# Patient Record
Sex: FEMALE | URBAN - METROPOLITAN AREA
[De-identification: names, ages, dates, MRNs, and addresses within clinical notes are randomized per-mention and may not be internally consistent; named-entity substitution may affect disease eponyms.]

---

## 2019-10-31 ENCOUNTER — APPOINTMENT (RX ONLY)
Dept: URBAN - METROPOLITAN AREA CLINIC 36 | Facility: CLINIC | Age: 64
Setting detail: DERMATOLOGY
End: 2019-10-31

## 2019-10-31 DIAGNOSIS — Z41.9 ENCOUNTER FOR PROCEDURE FOR PURPOSES OTHER THAN REMEDYING HEALTH STATE, UNSPECIFIED: ICD-10-CM

## 2019-10-31 PROCEDURE — ? BOTOX

## 2019-10-31 NOTE — PROCEDURE: BOTOX
Show Additional Area 1: Yes
Dilution (U/0.1 Cc): 0.6
Masseter Units: 0
Forehead Units: 15
Show Lcl Units: No
Additional Area 1 Location: upper lip
Additional Area 3 Location: masseter
Additional Area 2 Location: chin
Consent: Written consent obtained. Risks include but not limited to lid/brow ptosis, bruising, swelling, diplopia, temporary effect, incomplete chemical denervation.
Periorbital Skin Units: 14
Lot #: [id number]
Detail Level: Detailed
Post-Care Instructions: Patient instructed to not lie down for 4 hours and limit physical activity for 24 hours. Patient instructed not to travel by airplane for 48 hours.
Depressor Anguli Oris Units: 6
Expiration Date (Month Year): 04/2022

## 2024-07-22 ENCOUNTER — OFFICE (OUTPATIENT)
Dept: URBAN - METROPOLITAN AREA CLINIC 51 | Facility: CLINIC | Age: 69
End: 2024-07-22

## 2024-07-22 VITALS
HEART RATE: 86 BPM | TEMPERATURE: 96.9 F | WEIGHT: 120 LBS | DIASTOLIC BLOOD PRESSURE: 67 MMHG | HEIGHT: 64 IN | SYSTOLIC BLOOD PRESSURE: 116 MMHG

## 2024-07-22 DIAGNOSIS — R15.9 INCONTINENCE OF BOWEL: ICD-10-CM

## 2024-07-22 DIAGNOSIS — R11.2 NAUSEA AND VOMITING: ICD-10-CM

## 2024-07-22 DIAGNOSIS — Z12.11 SCREENING FOR COLON CANCER: ICD-10-CM

## 2024-07-22 DIAGNOSIS — K59.00 CONSTIPATION: ICD-10-CM

## 2024-07-22 DIAGNOSIS — K31.84 GASTROPARESIS: ICD-10-CM

## 2024-07-22 PROCEDURE — 99204 OFFICE O/P NEW MOD 45 MIN: CPT | Performed by: SPECIALIST

## 2024-07-22 NOTE — SERVICEHPINOTES
Savanah Cardona is a 68-year-old female patient presenting today for an evaluation of gastroparesis and constipation. She states that she has chronic constipation. She has hard stools and then 2-3 times a week she gets loose stools and rectal incontinence. She states that she has had incontinence with soft stools without blood or mucus. She has tried citrucil and it made it worse. We discussed the lower dose metamucil with possible imodium. We talked about a slow addition of fiber to produce soft stools to reverse the constipation. She was to use Imodium to get a formed stool, which should improve the incontinence and prevent hard stools. She has a hx of episiotomy which can lead to rectal incontinence. She has hx of colon resection for diverticulitis. She states that she has nausea and vomiting and has the episodes 6 times a year. She states that she gets dumping syndrome with raw vegetables. She has bloating and had a positive test for gastroparesis. She has abdominal distension. The patient has no family history of colon cancer or other significant GI diseases. The patient does not endorse having any abdominal pain. The patient does not have any chest ache or sore throat. The patient denies rectal bleeding. There is no history of losing weight abnormally and the patient’s weight has been stable. We can achieve less sxs despite the multiple sxs and we gave her a list of immediate measures to improve the sxs.  77

## 2024-07-22 NOTE — SERVICENOTES
The patient and family/friends if present were apprised of the use of a virtual scribe through remote viewing and/or audio and all parties consented to conduct the visit in this manner. Documentation assistance was provided by Halley Rodriguez.

## 2024-10-17 ENCOUNTER — OFFICE (OUTPATIENT)
Dept: URBAN - METROPOLITAN AREA CLINIC 51 | Facility: CLINIC | Age: 69
End: 2024-10-17

## 2024-10-17 VITALS — HEIGHT: 64 IN

## 2024-10-17 DIAGNOSIS — K64.9 HEMORRHOIDS: ICD-10-CM

## 2024-10-17 DIAGNOSIS — K44.9 HIATAL HERNIA: ICD-10-CM

## 2024-10-17 DIAGNOSIS — K22.9 IRREGULAR Z LINE OF ESOPHAGUS: ICD-10-CM

## 2024-10-17 DIAGNOSIS — K29.70 GASTRITIS: ICD-10-CM

## 2024-10-17 DIAGNOSIS — K31.7 BENIGN GASTRIC POLYP: ICD-10-CM

## 2024-10-17 DIAGNOSIS — Z86.010 PERSONAL HISTORY COLON POLYPS: ICD-10-CM

## 2024-10-17 DIAGNOSIS — K22.70 BARRETTS ESOPHAGUS: ICD-10-CM

## 2024-10-17 DIAGNOSIS — K59.00 CONSTIPATION: ICD-10-CM

## 2024-10-17 DIAGNOSIS — R15.9 INCONTINENCE OF BOWEL: ICD-10-CM

## 2024-10-17 DIAGNOSIS — K57.90 DIVERTICULOSIS: ICD-10-CM

## 2024-10-17 DIAGNOSIS — K31.84 GASTROPARESIS: ICD-10-CM

## 2024-10-17 PROCEDURE — 99213 OFFICE O/P EST LOW 20 MIN: CPT

## 2024-10-17 PROCEDURE — G2211 COMPLEX E/M VISIT ADD ON: HCPCS

## 2024-10-17 NOTE — SERVICEHPINOTES
10/17/2024: Savanah Cardona is a 69-year-old female that underwent an upper endoscopy and colonoscopy on 08/26/2024. The Edoscopcy showed Z-line irregular at the gastroesophageal junction, medium haital hernia, gastriris, non-bleeding duodenal ulcer with no stigmata of bleeding. The biopsy showed moderate duodenitis, gastritis, reflux esophagitis, Smith's no infection and no cancer, The colonoscopy showed 2 (4 to 6 mm) polyps in the ascending colon, diverticulitis in the sigmoid colon and descending colon, non-bleeding non thrombosed internal hemorrhoid, the biopsy was benign. A repeat colonoscopy is recommended in 5 years. (08/2029). 
br
br The consistency of the stools goes from formed to lose, she experiences fecal incontinence, she denies injury to back and back problems. The patient denies abdominal pain, distension, bloating, or fullness. The patient does not have any dyspepsia, nausea, or vomiting. Denies rectal bleeding or melena. The patient does not have any unintentional weight loss. Denies shortness of breath and chest pain.
br
br 07/22/2024: Savanah Cardona is a 68-year-old female patient presenting today for an evaluation of gastroparesis and constipation. She states that she has chronic constipation. She has hard stools and then 2-3 times a week she gets loose stools and rectal incontinence. She states that she has had incontinence with soft stools without blood or mucus. She has tried citrucil and it made it worse. We discussed the lower dose metamucil with possible imodium. We talked about a slow addition of fiber to produce soft stools to reverse the constipation. She was to use Imodium to get a formed stool, which should improve the incontinence and prevent hard stools. She has a hx of episiotomy which can lead to rectal incontinence. She has hx of colon resection for diverticulitis. She states that she has nausea and vomiting and has the episodes 6 times a year. She states that she gets dumping syndrome with raw vegetables. She has bloating and had a positive test for gastroparesis. She has abdominal distension. The patient has no family history of colon cancer or other significant GI diseases. The patient does not endorse having any abdominal pain. The patient does not have any chest ache or sore throat. The patient denies rectal bleeding. There is no history of losing weight abnormally and the patient’s weight has been stable. We can achieve less sxs despite the multiple sxs and we gave her a list of immediate measures to improve the sxs.

## 2025-01-23 VITALS
WEIGHT: 116 LBS | DIASTOLIC BLOOD PRESSURE: 68 MMHG | SYSTOLIC BLOOD PRESSURE: 133 MMHG | HEART RATE: 83 BPM | HEIGHT: 64 IN

## 2025-01-27 ENCOUNTER — OFFICE (OUTPATIENT)
Dept: URBAN - METROPOLITAN AREA CLINIC 95 | Facility: CLINIC | Age: 70
End: 2025-01-27

## 2025-01-27 DIAGNOSIS — R32 URINARY INCONTINENCE: ICD-10-CM

## 2025-01-27 DIAGNOSIS — K59.00 CONSTIPATION: ICD-10-CM

## 2025-01-27 DIAGNOSIS — R15.9 FECAL INCONTINENCE: ICD-10-CM

## 2025-01-27 PROCEDURE — G2211 COMPLEX E/M VISIT ADD ON: HCPCS | Performed by: INTERNAL MEDICINE

## 2025-01-27 PROCEDURE — 99203 OFFICE O/P NEW LOW 30 MIN: CPT | Mod: 25 | Performed by: INTERNAL MEDICINE

## 2025-01-27 NOTE — SERVICEHPINOTES
01/27/2025:  SAVANAH HAJI   returns today for a follow-up from the last visit on  10/17/2024  . The patient is a 69-year-old female presenting for evaluation of fecal incontinence. The patient reports episodes of fecal incontinence involving both solid and liquid stools. Her baseline bowel habits are characterized by constipation, and she frequently experiences a sensation of incomplete evacuation. She manages her constipation with Miralax as needed. She typically spends about 2 minutes on the toilet during bowel movements and does not prolong the time if evacuation is unsuccessful.The patient also has a history of urinary incontinence, which was previously treated surgically with a sling. However, she notes a recurrence of urinary incontinence symptoms.Past Diagnostic Results:br- Colonoscopy: Unremarkable, performed by Dr. Benavidez.The patient was informed that the conversation was recorded for scribing purposes. The patient has given verbal consent.   br
br
br 10/17/2024: Savanah Haji is a 69-year-old female that underwent an upper endoscopy and colonoscopy on 08/26/2024. The Edoscopcy showed Z-line irregular at the gastroesophageal junction, medium haital hernia, gastriris, non-bleeding duodenal ulcer with no stigmata of bleeding. The biopsy showed moderate duodenitis, gastritis, reflux esophagitis, Smith's no infection and no cancer, The colonoscopy showed 2 (4 to 6 mm) polyps in the ascending colon, diverticulitis in the sigmoid colon and descending colon, non-bleeding non thrombosed internal hemorrhoid, the biopsy was benign. A repeat colonoscopy is recommended in 5 years. (08/2029).The consistency of the stools goes from formed to lose, she experiences fecal incontinence, she denies injury to back and back problems. The patient denies abdominal pain, distension, bloating, or fullness. The patient does not have any dyspepsia, nausea, or vomiting. Denies rectal bleeding or melena. The patient does not have any unintentional weight loss. Denies shortness of breath and chest pain.07/22/2024: Savanah Haji is a 68-year-old female patient presenting today for an evaluation of gastroparesis and constipation. She states that she has chronic constipation. She has hard stools and then 2-3 times a week she gets loose stools and rectal incontinence. She states that she has had incontinence with soft stools without blood or mucus. She has tried citrucil and it made it worse. We discussed the lower dose metamucil with possible imodium. We talked about a slow addition of fiber to produce soft stools to reverse the constipation. She was to use Imodium to get a formed stool, which should improve the incontinence and prevent hard stools. She has a hx of episiotomy which can lead to rectal incontinence. She has hx of colon resection for diverticulitis. She states that she has nausea and vomiting and has the episodes 6 times a year. She states that she gets dumping syndrome with raw vegetables. She has bloating and had a positive test for gastroparesis. She has abdominal distension. The patient has no family history of colon cancer or other significant GI diseases. The patient does not endorse having any abdominal pain. The patient does not have any chest ache or sore throat. The patient denies rectal bleeding. There is no history of losing weight abnormally and the patient’s weight has been stable. We can achieve less sxs despite the multiple sxs and we gave her a list of immediate measures to improve the sxs.